# Patient Record
Sex: MALE | Race: WHITE | NOT HISPANIC OR LATINO | Employment: FULL TIME | URBAN - METROPOLITAN AREA
[De-identification: names, ages, dates, MRNs, and addresses within clinical notes are randomized per-mention and may not be internally consistent; named-entity substitution may affect disease eponyms.]

---

## 2023-11-20 ENCOUNTER — HOSPITAL ENCOUNTER (EMERGENCY)
Facility: CLINIC | Age: 48
Discharge: HOME | End: 2023-11-20
Attending: EMERGENCY MEDICINE | Admitting: EMERGENCY MEDICINE
Payer: COMMERCIAL

## 2023-11-20 VITALS
RESPIRATION RATE: 18 BRPM | TEMPERATURE: 98 F | OXYGEN SATURATION: 99 % | HEART RATE: 86 BPM | SYSTOLIC BLOOD PRESSURE: 153 MMHG | DIASTOLIC BLOOD PRESSURE: 116 MMHG

## 2023-11-20 DIAGNOSIS — Z78.9 ALCOHOL USE: ICD-10-CM

## 2023-11-20 DIAGNOSIS — R07.89 CHEST PRESSURE: ICD-10-CM

## 2023-11-20 DIAGNOSIS — I10 HYPERTENSION, UNSPECIFIED TYPE: ICD-10-CM

## 2023-11-20 LAB
ANION GAP SERPL CALCULATED.3IONS-SCNC: 12 MMOL/L (ref 7–15)
BASOPHILS # BLD AUTO: 0.1 10E3/UL (ref 0–0.2)
BASOPHILS NFR BLD AUTO: 1 %
BUN SERPL-MCNC: 12.9 MG/DL (ref 6–20)
CALCIUM SERPL-MCNC: 9.8 MG/DL (ref 8.6–10)
CHLORIDE SERPL-SCNC: 99 MMOL/L (ref 98–107)
CREAT SERPL-MCNC: 1.05 MG/DL (ref 0.67–1.17)
DEPRECATED HCO3 PLAS-SCNC: 28 MMOL/L (ref 22–29)
EGFRCR SERPLBLD CKD-EPI 2021: 89 ML/MIN/1.73M2
EOSINOPHIL # BLD AUTO: 0.2 10E3/UL (ref 0–0.7)
EOSINOPHIL NFR BLD AUTO: 2 %
ERYTHROCYTE [DISTWIDTH] IN BLOOD BY AUTOMATED COUNT: 12.6 % (ref 10–15)
GLUCOSE SERPL-MCNC: 106 MG/DL (ref 70–99)
HCT VFR BLD AUTO: 43.5 % (ref 40–53)
HGB BLD-MCNC: 15.4 G/DL (ref 13.3–17.7)
HOLD SPECIMEN: NORMAL
HOLD SPECIMEN: NORMAL
IMM GRANULOCYTES # BLD: 0 10E3/UL
IMM GRANULOCYTES NFR BLD: 0 %
LYMPHOCYTES # BLD AUTO: 2.1 10E3/UL (ref 0.8–5.3)
LYMPHOCYTES NFR BLD AUTO: 29 %
MCH RBC QN AUTO: 32.3 PG (ref 26.5–33)
MCHC RBC AUTO-ENTMCNC: 35.4 G/DL (ref 31.5–36.5)
MCV RBC AUTO: 91 FL (ref 78–100)
MONOCYTES # BLD AUTO: 0.8 10E3/UL (ref 0–1.3)
MONOCYTES NFR BLD AUTO: 11 %
NEUTROPHILS # BLD AUTO: 4.2 10E3/UL (ref 1.6–8.3)
NEUTROPHILS NFR BLD AUTO: 57 %
NRBC # BLD AUTO: 0 10E3/UL
NRBC BLD AUTO-RTO: 0 /100
PLATELET # BLD AUTO: 305 10E3/UL (ref 150–450)
POTASSIUM SERPL-SCNC: 4.2 MMOL/L (ref 3.4–5.3)
RBC # BLD AUTO: 4.77 10E6/UL (ref 4.4–5.9)
SODIUM SERPL-SCNC: 139 MMOL/L (ref 135–145)
TROPONIN T SERPL HS-MCNC: <6 NG/L
WBC # BLD AUTO: 7.3 10E3/UL (ref 4–11)

## 2023-11-20 PROCEDURE — 85014 HEMATOCRIT: CPT | Performed by: EMERGENCY MEDICINE

## 2023-11-20 PROCEDURE — 80048 BASIC METABOLIC PNL TOTAL CA: CPT | Performed by: EMERGENCY MEDICINE

## 2023-11-20 PROCEDURE — 99284 EMERGENCY DEPT VISIT MOD MDM: CPT

## 2023-11-20 PROCEDURE — 36415 COLL VENOUS BLD VENIPUNCTURE: CPT | Performed by: EMERGENCY MEDICINE

## 2023-11-20 PROCEDURE — 84484 ASSAY OF TROPONIN QUANT: CPT | Performed by: EMERGENCY MEDICINE

## 2023-11-20 PROCEDURE — 85025 COMPLETE CBC W/AUTO DIFF WBC: CPT | Performed by: EMERGENCY MEDICINE

## 2023-11-20 PROCEDURE — 93005 ELECTROCARDIOGRAM TRACING: CPT

## 2023-11-20 ASSESSMENT — ACTIVITIES OF DAILY LIVING (ADL): ADLS_ACUITY_SCORE: 33

## 2023-11-20 NOTE — ED TRIAGE NOTES
"Presents to triage with c/o HTN. Patient was feeling \"pressure\" in his head and chest which prompted him to buy a BP machine. BP at home was 160s over 120s. Denies any cardiac hx or previous HTN. No headache or vision changes. ABCs intact in triage.         "

## 2023-11-21 LAB
ATRIAL RATE - MUSE: 71 BPM
DIASTOLIC BLOOD PRESSURE - MUSE: NORMAL MMHG
INTERPRETATION ECG - MUSE: NORMAL
P AXIS - MUSE: 32 DEGREES
PR INTERVAL - MUSE: 160 MS
QRS DURATION - MUSE: 100 MS
QT - MUSE: 392 MS
QTC - MUSE: 425 MS
R AXIS - MUSE: 4 DEGREES
SYSTOLIC BLOOD PRESSURE - MUSE: NORMAL MMHG
T AXIS - MUSE: 17 DEGREES
VENTRICULAR RATE- MUSE: 71 BPM

## 2023-11-21 NOTE — ED PROVIDER NOTES
History     Chief Complaint:  Hypertension     The history is provided by the patient.      Erlin Snow is a 48 year old male presenting with hypertension. Blood pressure has been trending upwards but has never been on medications for it. He notes he was drinking yesterday and originally attributed the symptoms to this. He endorses tightness in the chest last night and had trouble sleeping. He could feel his heart beat in his ears. When it wasn't better this afternoon, he bought a blood pressure cuff and presented to the ED. Denies vomiting or diarrhea. Able to ambulate and talk. Of note, patient reports he drinks everyday and did last night    Independent Historian:   None - Patient Only    Review of External Notes:   None      Medications:    No current outpatient medications on file.    Past Medical History:    No past medical history on file.      Physical Exam   Patient Vitals for the past 24 hrs:   BP Temp Temp src Pulse Resp SpO2   11/20/23 1731 (!) 153/116 98  F (36.7  C) Temporal 86 18 99 %      Physical Exam  General: Patient is well appearing. No distress.  Head: Atraumatic.  Eyes: Conjunctivae and EOM are normal. No scleral icterus.  Neck: Normal range of motion. Neck supple.   Cardiovascular: Normal rate, regular rhythm, normal heart sounds and intact distal pulses.   Pulmonary/Chest: Breath sounds normal. No respiratory distress.  Abdominal: Soft. Bowel sounds are normal. No distension. No tenderness. No rebound or guarding.   Musculoskeletal: Normal range of motion.  Skin: Warm and dry. No rash noted. Not diaphoretic.      Emergency Department Course   ECG  ECG results from 11/20/23   EKG 12-lead, tracing only     Value    Systolic Blood Pressure     Diastolic Blood Pressure     Ventricular Rate 71    Atrial Rate 71    DC Interval 160    QRS Duration 100        QTc 425    P Axis 32    R AXIS 4    T Axis 17    Interpretation ECG      Sinus rhythm with marked sinus arrhythmia  Otherwise normal  ECG  No previous ECGs available       Imaging:  No orders to display     Laboratory:  Labs Ordered and Resulted from Time of ED Arrival to Time of ED Departure   BASIC METABOLIC PANEL - Abnormal       Result Value    Sodium 139      Potassium 4.2      Chloride 99      Carbon Dioxide (CO2) 28      Anion Gap 12      Urea Nitrogen 12.9      Creatinine 1.05      GFR Estimate 89      Calcium 9.8      Glucose 106 (*)    TROPONIN T, HIGH SENSITIVITY - Normal    Troponin T, High Sensitivity <6     CBC WITH PLATELETS AND DIFFERENTIAL    WBC Count 7.3      RBC Count 4.77      Hemoglobin 15.4      Hematocrit 43.5      MCV 91      MCH 32.3      MCHC 35.4      RDW 12.6      Platelet Count 305      % Neutrophils 57      % Lymphocytes 29      % Monocytes 11      % Eosinophils 2      % Basophils 1      % Immature Granulocytes 0      NRBCs per 100 WBC 0      Absolute Neutrophils 4.2      Absolute Lymphocytes 2.1      Absolute Monocytes 0.8      Absolute Eosinophils 0.2      Absolute Basophils 0.1      Absolute Immature Granulocytes 0.0      Absolute NRBCs 0.0        Emergency Department Course & Assessments:    Interventions:  Medications - No data to display     Independent Interpretation (X-rays, CTs, rhythm strip):  None    Assessments/Consultations/Discussion of Management or Tests:  ED Course as of 11/20/23 2352   Mon Nov 20, 2023 2104 I obtained the history and examined the patient as noted above.        Social Determinants of Health affecting care:   None    Disposition:  The patient was discharged to home.     Impression & Plan      Medical Decision Making:  Erlin Snow is a 48 year old male who presents for evaluation of elevated blood pressure.  There is a trending upward history of hypertension in the past.  The workup here is negative and the patient does not have any clinical, laboratory, ecg or historical signs of end-organ dysfunction.  There is no signs of hypertensive emergency or urgency.  Supportive outpatient  management is therefore indicated with close follow-up of primary care physician.  Discussed drinking sensation withdrawal effects need for BP logs strict return and follow up back home in [place] and red flags. Given data obtained here in ED, will initiate a blood pressure log for therapy at this time and encouraged serial blood pressure monitoring at home to aid primary in decision making regarding hypertension.       Diagnosis:    ICD-10-CM    1. Hypertension, unspecified type  I10       2. Chest pressure  R07.89       3. Alcohol use  Z78.9            Discharge Medications:  There are no discharge medications for this patient.     Scribe Disclosure:  I, Wandy Rosa, am serving as a scribe at 9:03 PM on 11/20/2023 to document services personally performed by Travis Orosco MD based on my observations and the provider's statements to me.   11/20/2023   Travis Orosco MD Stevens, Andrew C, MD  11/20/23 3711

## 2023-12-15 ENCOUNTER — TELEPHONE ENCOUNTER (OUTPATIENT)
Dept: URBAN - METROPOLITAN AREA CLINIC 128 | Facility: CLINIC | Age: 48
End: 2023-12-15

## 2023-12-18 ENCOUNTER — OUT OF OFFICE VISIT (OUTPATIENT)
Dept: URBAN - METROPOLITAN AREA SURGERY CENTER 31 | Facility: SURGERY CENTER | Age: 48
End: 2023-12-18
Payer: COMMERCIAL

## 2023-12-18 DIAGNOSIS — Z86.010 ADENOMAS PERSONAL HISTORY OF COLONIC POLYPS: ICD-10-CM

## 2023-12-18 DIAGNOSIS — K63.5 BENIGN COLONIC POLYP: ICD-10-CM

## 2023-12-18 DIAGNOSIS — K62.1 ANAL AND RECTAL POLYP: ICD-10-CM

## 2023-12-18 PROCEDURE — G8907 PT DOC NO EVENTS ON DISCHARG: HCPCS | Performed by: INTERNAL MEDICINE

## 2023-12-18 PROCEDURE — 45380 COLONOSCOPY AND BIOPSY: CPT | Performed by: INTERNAL MEDICINE

## 2023-12-18 PROCEDURE — 00811 ANES LWR INTST NDSC NOS: CPT | Performed by: NURSE ANESTHETIST, CERTIFIED REGISTERED

## 2024-11-08 ENCOUNTER — DASHBOARD ENCOUNTERS (OUTPATIENT)
Age: 49
End: 2024-11-08

## 2024-11-08 ENCOUNTER — LAB OUTSIDE AN ENCOUNTER (OUTPATIENT)
Dept: URBAN - METROPOLITAN AREA CLINIC 80 | Facility: CLINIC | Age: 49
End: 2024-11-08

## 2024-11-08 ENCOUNTER — OFFICE VISIT (OUTPATIENT)
Dept: URBAN - METROPOLITAN AREA CLINIC 80 | Facility: CLINIC | Age: 49
End: 2024-11-08
Payer: COMMERCIAL

## 2024-11-08 VITALS
TEMPERATURE: 97.3 F | DIASTOLIC BLOOD PRESSURE: 80 MMHG | HEART RATE: 60 BPM | WEIGHT: 234 LBS | SYSTOLIC BLOOD PRESSURE: 118 MMHG | HEIGHT: 72 IN | BODY MASS INDEX: 31.69 KG/M2

## 2024-11-08 DIAGNOSIS — R12 HEARTBURN: ICD-10-CM

## 2024-11-08 DIAGNOSIS — K59.00 CONSTIPATION, UNSPECIFIED CONSTIPATION TYPE: ICD-10-CM

## 2024-11-08 DIAGNOSIS — R10.9 ABDOMINAL PAIN, UNSPECIFIED ABDOMINAL LOCATION: ICD-10-CM

## 2024-11-08 DIAGNOSIS — R19.7 DIARRHEA, UNSPECIFIED TYPE: ICD-10-CM

## 2024-11-08 DIAGNOSIS — Z86.0100 PERSONAL HISTORY OF COLONIC POLYPS: ICD-10-CM

## 2024-11-08 DIAGNOSIS — K76.0 FATTY LIVER: ICD-10-CM

## 2024-11-08 PROBLEM — 14760008: Status: ACTIVE | Noted: 2024-11-08

## 2024-11-08 PROBLEM — 197321007: Status: ACTIVE | Noted: 2024-11-08

## 2024-11-08 PROCEDURE — 99214 OFFICE O/P EST MOD 30 MIN: CPT

## 2024-11-08 RX ORDER — ATORVASTATIN CALCIUM 10 MG/1
TABLET, FILM COATED ORAL
Qty: 90 TABLET | Status: ACTIVE | COMMUNITY

## 2024-11-08 RX ORDER — TRAZODONE HYDROCHLORIDE 50 MG/1
TAKE 1 TABLET BY MOUTH EVERY DAY TABLET ORAL
Qty: 30 EACH | Refills: 0 | Status: ACTIVE | COMMUNITY

## 2024-11-08 RX ORDER — HYOSCYAMINE SULFATE 0.12 MG/1
1 TABLET UNDER THE TONGUE AND ALLOW TO DISSOLVE AS NEEDED TABLET SUBLINGUAL THREE TIMES A DAY
Qty: 30 | Refills: 0 | OUTPATIENT
Start: 2024-11-08 | End: 2024-12-08

## 2024-11-08 RX ORDER — APIXABAN 5 MG (74)
TAKE 2 TABLETS BY MOUTH TWICE A DAY FOR 7 DAYS THEN 1 TAB TWICE A DAY KIT ORAL
Qty: 74 EACH | Refills: 0 | Status: ACTIVE | COMMUNITY

## 2024-11-08 RX ORDER — OMEPRAZOLE 40 MG/1
1 CAPSULE CAPSULE, DELAYED RELEASE ORAL AS NEEDED
Status: ACTIVE | COMMUNITY

## 2024-11-08 RX ORDER — METOPROLOL 25 MG/1
TABLET ORAL
Qty: 60 TABLET | Status: ACTIVE | COMMUNITY

## 2024-11-08 RX ORDER — OMEPRAZOLE 40 MG/1
1 CAPSULE 30 MINUTES BEFORE MORNING MEAL CAPSULE, DELAYED RELEASE ORAL ONCE A DAY
Qty: 90 | Refills: 3 | OUTPATIENT
Start: 2024-11-08

## 2024-11-08 NOTE — HPI-TODAY'S VISIT:
Patient was referred by Lokesh Park NP for GERD.    Patient presents with pain on both sides of his abdomen. This has been occurring intermittently x years. Pain occurs in the mid portion of pt bilateral sides. When it occurs, it lasts 1-2 weeks. The pain is dull. Pt has had an appendectomy. denies fever/chills. Pain radiates across his abdomen, but tends to start on the right side. Ibuprofen can help this pain. Eating or BM do not affect this pain. Pt denies excessive ibuprofen use. denies N/V. Pt thinks he had a CT years ago for this pain that he notes showed nothing concerning. Pt notes pain will start on the right and gradually move to the left side of his abdomen. Pt denies any urinary symptoms including blood in urine or dysuria.   CT in 10/2022 showed IMPRESSION:. . No CT correlate for right lower quadrant abdominal pain. Noncalcified subcentimeter pulmonary nodules seen within the visualized lung bases, measurements as above. Nonemergent dedicated chest CT recommended to evaluate for potential additional pulmonary nodules and establish a baseline for further follow-up.    No family history of colon polyps or colon cancer. Denies any family history of GI related cancer. Patient denies nausea, dysphagia, rectal bleeding, melena, unintentional weight loss, and loss of appetite. Patient denies pacemaker/defibrillator, diabetes, kidney disease, and home O2.  +blood thinner, on Eliquis due to a fib    Patient does note heartburn a couple of times a week x years. He takes omeprazole and tums only as needed. These medications do not help his abdominal pain. pt had an EGD in 2016 that pt notes revealed nothing concerning.     Pt notes his normal bowel habit is 1-2 BM a day and well formed. When he is having the pain his bowel movement s can change from diarrhea, bss 6 to hard pellets. Denies family history of IBD. Last episode of diarrhea was a couple of weeks ago.     Last episode of pain occurred 2-3 weeks ago.      Noted on referral to have a history of steatosis of liver    colonoscopy 12/18/2023 showed one diminutive polyp in the rectum, removed. Path showed hyperplastic polyp. Repeat advised in 5 years.

## 2024-12-04 ENCOUNTER — TELEPHONE ENCOUNTER (OUTPATIENT)
Dept: URBAN - METROPOLITAN AREA CLINIC 80 | Facility: CLINIC | Age: 49
End: 2024-12-04

## 2025-01-29 ENCOUNTER — TELEPHONE ENCOUNTER (OUTPATIENT)
Dept: URBAN - METROPOLITAN AREA CLINIC 80 | Facility: CLINIC | Age: 50
End: 2025-01-29

## 2025-02-17 ENCOUNTER — TELEPHONE ENCOUNTER (OUTPATIENT)
Dept: URBAN - METROPOLITAN AREA CLINIC 80 | Facility: CLINIC | Age: 50
End: 2025-02-17

## 2025-02-17 RX ORDER — OMEPRAZOLE 40 MG/1
1 CAPSULE 30 MINUTES BEFORE MORNING MEAL CAPSULE, DELAYED RELEASE ORAL ONCE A DAY
Qty: 90 | Refills: 3
Start: 2024-11-08

## 2025-02-21 ENCOUNTER — OFFICE VISIT (OUTPATIENT)
Dept: URBAN - METROPOLITAN AREA SURGERY CENTER 19 | Facility: SURGERY CENTER | Age: 50
End: 2025-02-21

## 2025-03-11 ENCOUNTER — OFFICE VISIT (OUTPATIENT)
Dept: URBAN - METROPOLITAN AREA SURGERY CENTER 19 | Facility: SURGERY CENTER | Age: 50
End: 2025-03-11
Payer: COMMERCIAL

## 2025-03-11 ENCOUNTER — TELEPHONE ENCOUNTER (OUTPATIENT)
Dept: URBAN - METROPOLITAN AREA CLINIC 80 | Facility: CLINIC | Age: 50
End: 2025-03-11

## 2025-03-11 DIAGNOSIS — R10.11 ABDOMINAL PAIN, RIGHT UPPER QUADRANT: ICD-10-CM

## 2025-03-11 DIAGNOSIS — K29.70 GASTRITIS, UNSPECIFIED, WITHOUT BLEEDING: ICD-10-CM

## 2025-03-11 DIAGNOSIS — K29.60 OTHER GASTRITIS WITHOUT BLEEDING: ICD-10-CM

## 2025-03-11 DIAGNOSIS — K22.70 BARRETT'S ESOPHAGUS WITHOUT DYSPLASIA: ICD-10-CM

## 2025-03-11 DIAGNOSIS — K22.89 OTHER SPECIFIED DISEASE OF ESOPHAGUS: ICD-10-CM

## 2025-03-11 PROCEDURE — 00731 ANES UPR GI NDSC PX NOS: CPT | Performed by: ANESTHESIOLOGY

## 2025-03-11 PROCEDURE — 43239 EGD BIOPSY SINGLE/MULTIPLE: CPT | Performed by: STUDENT IN AN ORGANIZED HEALTH CARE EDUCATION/TRAINING PROGRAM

## 2025-03-11 RX ORDER — TRAZODONE HYDROCHLORIDE 50 MG/1
TAKE 1 TABLET BY MOUTH EVERY DAY TABLET ORAL
Qty: 30 EACH | Refills: 0 | Status: ACTIVE | COMMUNITY

## 2025-03-11 RX ORDER — ATORVASTATIN CALCIUM 10 MG/1
TABLET, FILM COATED ORAL
Qty: 90 TABLET | Status: ACTIVE | COMMUNITY

## 2025-03-11 RX ORDER — OMEPRAZOLE 40 MG/1
1 CAPSULE CAPSULE, DELAYED RELEASE ORAL AS NEEDED
Status: ACTIVE | COMMUNITY

## 2025-03-11 RX ORDER — METOPROLOL 25 MG/1
TABLET ORAL
Qty: 60 TABLET | Status: ACTIVE | COMMUNITY

## 2025-03-11 RX ORDER — APIXABAN 5 MG (74)
TAKE 2 TABLETS BY MOUTH TWICE A DAY FOR 7 DAYS THEN 1 TAB TWICE A DAY KIT ORAL
Qty: 74 EACH | Refills: 0 | Status: ACTIVE | COMMUNITY

## 2025-03-11 RX ORDER — OMEPRAZOLE 40 MG/1
1 CAPSULE 30 MINUTES BEFORE MORNING MEAL CAPSULE, DELAYED RELEASE ORAL ONCE A DAY
Qty: 90 | Refills: 3 | Status: ACTIVE | COMMUNITY
Start: 2024-11-08

## 2025-03-18 ENCOUNTER — LAB OUTSIDE AN ENCOUNTER (OUTPATIENT)
Dept: URBAN - METROPOLITAN AREA CLINIC 80 | Facility: CLINIC | Age: 50
End: 2025-03-18

## 2025-03-18 ENCOUNTER — TELEPHONE ENCOUNTER (OUTPATIENT)
Dept: URBAN - METROPOLITAN AREA CLINIC 80 | Facility: CLINIC | Age: 50
End: 2025-03-18

## 2025-04-02 ENCOUNTER — OFFICE VISIT (OUTPATIENT)
Dept: URBAN - METROPOLITAN AREA CLINIC 79 | Facility: CLINIC | Age: 50
End: 2025-04-02
Payer: COMMERCIAL

## 2025-04-02 DIAGNOSIS — R10.9 ABDOMINAL PAIN, UNSPECIFIED ABDOMINAL LOCATION: ICD-10-CM

## 2025-04-02 DIAGNOSIS — K76.0 FATTY LIVER: ICD-10-CM

## 2025-04-02 PROCEDURE — 76705 ECHO EXAM OF ABDOMEN: CPT | Performed by: STUDENT IN AN ORGANIZED HEALTH CARE EDUCATION/TRAINING PROGRAM

## 2025-04-24 ENCOUNTER — OFFICE VISIT (OUTPATIENT)
Dept: URBAN - METROPOLITAN AREA CLINIC 80 | Facility: CLINIC | Age: 50
End: 2025-04-24
Payer: COMMERCIAL

## 2025-04-24 DIAGNOSIS — K22.70 BARRETT'S ESOPHAGUS WITHOUT DYSPLASIA: ICD-10-CM

## 2025-04-24 DIAGNOSIS — K76.0 FATTY LIVER: ICD-10-CM

## 2025-04-24 DIAGNOSIS — R12 HEARTBURN: ICD-10-CM

## 2025-04-24 DIAGNOSIS — R10.10 PAIN OF UPPER ABDOMEN: ICD-10-CM

## 2025-04-24 PROBLEM — 302914006: Status: ACTIVE | Noted: 2025-04-24

## 2025-04-24 PROCEDURE — 99213 OFFICE O/P EST LOW 20 MIN: CPT | Performed by: PHYSICIAN ASSISTANT

## 2025-04-24 RX ORDER — OMEPRAZOLE 40 MG/1
1 CAPSULE CAPSULE, DELAYED RELEASE ORAL AS NEEDED
Status: DISCONTINUED | COMMUNITY

## 2025-04-24 RX ORDER — METOPROLOL 25 MG/1
TABLET ORAL
Qty: 60 TABLET | Status: ACTIVE | COMMUNITY

## 2025-04-24 RX ORDER — NORTRIPTYLINE HYDROCHLORIDE 10 MG/1
1 CAPSULE AT BEDTIME CAPSULE ORAL ONCE A DAY
Qty: 90 CAPSULE | Refills: 3 | OUTPATIENT
Start: 2025-04-24

## 2025-04-24 RX ORDER — TRAZODONE HYDROCHLORIDE 50 MG/1
TAKE 1 TABLET BY MOUTH EVERY DAY TABLET ORAL
Qty: 30 EACH | Refills: 0 | Status: DISCONTINUED | COMMUNITY

## 2025-04-24 RX ORDER — OMEPRAZOLE 40 MG/1
1 CAPSULE 30 MINUTES BEFORE MORNING MEAL CAPSULE, DELAYED RELEASE ORAL ONCE A DAY
Qty: 90 | Refills: 3 | Status: ACTIVE | COMMUNITY
Start: 2024-11-08

## 2025-04-24 RX ORDER — APIXABAN 5 MG (74)
TAKE 2 TABLETS BY MOUTH TWICE A DAY FOR 7 DAYS THEN 1 TAB TWICE A DAY KIT ORAL
Qty: 74 EACH | Refills: 0 | Status: DISCONTINUED | COMMUNITY

## 2025-04-24 RX ORDER — ASPIRIN 81 MG/1
AS DIRECTED TABLET, COATED ORAL
Status: ACTIVE | COMMUNITY
Start: 2025-04-24

## 2025-04-24 RX ORDER — ATORVASTATIN CALCIUM 10 MG/1
TABLET, FILM COATED ORAL
Qty: 90 TABLET | Status: ACTIVE | COMMUNITY

## 2025-04-24 NOTE — HPI-ZZZTODAY'S VISIT
Patient had upper endoscopy on March 11, 2025.  Showed salmon-colored mucosa suspicious for short segment Henderson's esophagus.  Pathology showed chronic inactive gastritis and squamocolumnar mucosa with intestinal metaplasia at the distal esophagus consistent with Henderson's esophagus.  He also had an abdominal ultrasound done on April 2, 2025 showing fatty liver.  He had a HIDA scan on April 14, 2025 that was normal. On Omeprazole daily - helps with his heartburn the RUQ pain he gets - has been on and off for years - sometimes on the left side - he did get a little pain when he had the HIDA - Ibuprofen can help the pain at times no nausea or emesis BM are wnl no dysphagia eating does not make the pain worse no fevers or chills no family hx gb disease

## 2025-04-25 LAB
ABSOLUTE BASOPHILS: 32
ABSOLUTE EOSINOPHILS: 182
ABSOLUTE LYMPHOCYTES: 1691
ABSOLUTE MONOCYTES: 585
ABSOLUTE NEUTROPHILS: 5412
ALBUMIN/GLOBULIN RATIO: 2.1
ALBUMIN: 4.8
ALKALINE PHOSPHATASE: 69
ALT: 51
AST: 30
BASOPHILS: 0.4
BILIRUBIN, TOTAL: 0.7
BUN/CREATININE RATIO: (no result)
CALCIUM: 9.6
CARBON DIOXIDE: 25
CHLORIDE: 105
CREATININE: 1.03
EGFR: 90
EOSINOPHILS: 2.3
FIB 4 INDEX: 0.64
FIB 4 INTERPRETATION: (no result)
GLOBULIN: 2.3
GLUCOSE: 114
HEMATOCRIT: 41.8
HEMOGLOBIN: 14.6
INR: 1
LYMPHOCYTES: 21.4
MCH: 31.9
MCHC: 34.9
MCV: 91.5
MONOCYTES: 7.4
MPV: 9.3
NEUTROPHILS: 68.5
PLATELET COUNT: 310
PLATELET COUNT: 310
POTASSIUM: 4.2
PROTEIN, TOTAL: 7.1
PT: 10.7
RDW: 13.2
RED BLOOD CELL COUNT: 4.57
SODIUM: 141
UREA NITROGEN (BUN): 16
WHITE BLOOD CELL COUNT: 7.9